# Patient Record
Sex: MALE | Race: WHITE | NOT HISPANIC OR LATINO | Employment: FULL TIME | URBAN - METROPOLITAN AREA
[De-identification: names, ages, dates, MRNs, and addresses within clinical notes are randomized per-mention and may not be internally consistent; named-entity substitution may affect disease eponyms.]

---

## 2017-10-24 ENCOUNTER — TRANSCRIBE ORDERS (OUTPATIENT)
Dept: GENERAL RADIOLOGY | Facility: HOSPITAL | Age: 47
End: 2017-10-24

## 2017-10-24 ENCOUNTER — HOSPITAL ENCOUNTER (OUTPATIENT)
Dept: GENERAL RADIOLOGY | Facility: HOSPITAL | Age: 47
Discharge: HOME | End: 2017-10-24
Admitting: NURSE PRACTITIONER

## 2017-10-24 DIAGNOSIS — M25.562 LEFT KNEE PAIN, UNSPECIFIED CHRONICITY: Primary | ICD-10-CM

## 2017-10-24 PROCEDURE — 73562 X-RAY EXAM OF KNEE 3: CPT

## 2018-11-19 ENCOUNTER — TELEPHONE (OUTPATIENT)
Dept: SURGERY | Facility: CLINIC | Age: 48
End: 2018-11-19

## 2018-11-19 NOTE — TELEPHONE ENCOUNTER
I called pt, I left a message on voice mail asking him to return my call, I will also mail a letter to him asking him to call me as well.

## 2018-11-27 ENCOUNTER — PREP FOR SURGERY (OUTPATIENT)
Dept: OTHER | Facility: HOSPITAL | Age: 48
End: 2018-11-27

## 2018-11-27 ENCOUNTER — TELEPHONE (OUTPATIENT)
Dept: SURGERY | Facility: CLINIC | Age: 48
End: 2018-11-27

## 2018-11-27 DIAGNOSIS — Z12.11 SCREEN FOR COLON CANCER: Primary | ICD-10-CM

## 2018-11-27 RX ORDER — BISACODYL 5 MG/1
10 TABLET, DELAYED RELEASE ORAL 2 TIMES DAILY
Qty: 4 TABLET | Refills: 0 | Status: SHIPPED | OUTPATIENT
Start: 2018-11-27 | End: 2018-11-28

## 2018-11-27 RX ORDER — POLYETHYLENE GLYCOL 1450
1 POWDER (GRAM) MISCELLANEOUS
Qty: 238 G | Refills: 0 | Status: SHIPPED | OUTPATIENT
Start: 2018-11-27 | End: 2018-11-27

## 2018-12-10 PROBLEM — Z12.11 SCREEN FOR COLON CANCER: Status: ACTIVE | Noted: 2018-12-10

## 2019-01-28 ENCOUNTER — TELEPHONE (OUTPATIENT)
Dept: SURGERY | Facility: CLINIC | Age: 49
End: 2019-01-28

## 2019-01-28 NOTE — TELEPHONE ENCOUNTER
Left msg on voice mail for pt to confirm procedure scheduled on 01/30/19 @ [facility]

## 2020-04-06 ENCOUNTER — TRANSCRIBE ORDERS (OUTPATIENT)
Dept: ADMINISTRATIVE | Facility: HOSPITAL | Age: 50
End: 2020-04-06

## 2020-04-06 DIAGNOSIS — K40.20 HERNIA OF TESTICLES: Primary | ICD-10-CM

## 2020-04-07 ENCOUNTER — HOSPITAL ENCOUNTER (OUTPATIENT)
Dept: ULTRASOUND IMAGING | Facility: HOSPITAL | Age: 50
Discharge: HOME | End: 2020-04-07
Admitting: NURSE PRACTITIONER

## 2020-04-07 DIAGNOSIS — K40.20 HERNIA OF TESTICLES: ICD-10-CM

## 2020-04-07 PROCEDURE — 76870 US EXAM SCROTUM: CPT

## 2020-08-27 ENCOUNTER — TELEPHONE (OUTPATIENT)
Dept: SURGERY | Facility: CLINIC | Age: 50
End: 2020-08-27

## 2021-03-31 ENCOUNTER — IMMUNIZATION (OUTPATIENT)
Dept: VACCINE CLINIC | Facility: HOSPITAL | Age: 51
End: 2021-03-31

## 2021-03-31 PROCEDURE — 0001A: CPT | Performed by: INTERNAL MEDICINE

## 2021-03-31 PROCEDURE — 91300 HC SARSCOV02 VAC 30MCG/0.3ML IM: CPT | Performed by: INTERNAL MEDICINE

## 2021-04-21 ENCOUNTER — APPOINTMENT (OUTPATIENT)
Dept: VACCINE CLINIC | Facility: HOSPITAL | Age: 51
End: 2021-04-21

## 2021-04-27 ENCOUNTER — IMMUNIZATION (OUTPATIENT)
Dept: VACCINE CLINIC | Facility: HOSPITAL | Age: 51
End: 2021-04-27

## 2021-04-27 PROCEDURE — 91300 HC SARSCOV02 VAC 30MCG/0.3ML IM: CPT | Performed by: INTERNAL MEDICINE

## 2021-04-27 PROCEDURE — 0002A: CPT | Performed by: INTERNAL MEDICINE

## 2021-07-09 ENCOUNTER — TRANSCRIBE ORDERS (OUTPATIENT)
Dept: ADMINISTRATIVE | Facility: HOSPITAL | Age: 51
End: 2021-07-09

## 2021-07-09 DIAGNOSIS — S83.209A: Primary | ICD-10-CM

## 2021-07-28 ENCOUNTER — HOSPITAL ENCOUNTER (OUTPATIENT)
Dept: MRI IMAGING | Facility: HOSPITAL | Age: 51
Discharge: HOME | End: 2021-07-28
Admitting: ORTHOPAEDIC SURGERY

## 2021-07-28 DIAGNOSIS — S83.209A: ICD-10-CM

## 2021-07-28 PROCEDURE — 73721 MRI JNT OF LWR EXTRE W/O DYE: CPT

## 2021-09-02 ENCOUNTER — TRANSCRIBE ORDERS (OUTPATIENT)
Dept: ADMINISTRATIVE | Facility: HOSPITAL | Age: 51
End: 2021-09-02

## 2021-09-02 DIAGNOSIS — M25.561 RIGHT KNEE PAIN, UNSPECIFIED CHRONICITY: Primary | ICD-10-CM

## 2021-09-13 ENCOUNTER — HOSPITAL ENCOUNTER (OUTPATIENT)
Dept: NUCLEAR MEDICINE | Facility: HOSPITAL | Age: 51
Discharge: HOME | End: 2021-09-13

## 2021-09-13 DIAGNOSIS — M25.561 RIGHT KNEE PAIN, UNSPECIFIED CHRONICITY: ICD-10-CM

## 2021-09-13 PROCEDURE — 0 TECHNETIUM MEDRONATE KIT: Performed by: ORTHOPAEDIC SURGERY

## 2021-09-13 PROCEDURE — 78315 BONE IMAGING 3 PHASE: CPT

## 2021-09-13 PROCEDURE — A9503 TC99M MEDRONATE: HCPCS | Performed by: ORTHOPAEDIC SURGERY

## 2021-09-13 RX ORDER — TC 99M MEDRONATE 20 MG/10ML
25.4 INJECTION, POWDER, LYOPHILIZED, FOR SOLUTION INTRAVENOUS
Status: COMPLETED | OUTPATIENT
Start: 2021-09-13 | End: 2021-09-13

## 2021-09-13 RX ADMIN — TC 99M MEDRONATE 25.4 MILLICURIE: 20 INJECTION, POWDER, LYOPHILIZED, FOR SOLUTION INTRAVENOUS at 10:05

## 2021-10-13 ENCOUNTER — TRANSCRIBE ORDERS (OUTPATIENT)
Dept: PHYSICAL THERAPY | Facility: CLINIC | Age: 51
End: 2021-10-13

## 2021-10-13 DIAGNOSIS — M25.561 RIGHT KNEE PAIN, UNSPECIFIED CHRONICITY: Primary | ICD-10-CM

## 2021-10-21 ENCOUNTER — TREATMENT (OUTPATIENT)
Dept: PHYSICAL THERAPY | Facility: CLINIC | Age: 51
End: 2021-10-21

## 2021-10-21 DIAGNOSIS — M25.561 CHRONIC PAIN OF RIGHT KNEE: Primary | ICD-10-CM

## 2021-10-21 DIAGNOSIS — G89.29 CHRONIC PAIN OF RIGHT KNEE: Primary | ICD-10-CM

## 2021-10-21 PROCEDURE — 97161 PT EVAL LOW COMPLEX 20 MIN: CPT | Performed by: PHYSICAL THERAPIST

## 2021-10-21 NOTE — PROGRESS NOTES
Physical Therapy Initial Evaluation and Plan of Care      Patient: Mellisa Mathews   : 1970  Diagnosis/ICD-10 Code:  Right knee pain, unspecified chronicity [M25.561]  Referring practitioner: Nav Darnell MD    Subjective Evaluation    History of Present Illness  Mechanism of injury: Patient reports that his R knee gradually started hurting him. He states that the knee pain has worsened over the last several months. Patient states that a month ago he was unable to walk on the R knee at all (using crutches). He states now he is just walking with a limp (no crutches). Patient reports that he has to walk up/down stairs one at a time. He states that his sleep was disturbed until recently he has been able to sleep better. He states that tylenol seems to help his knee pain more than anything. He states that he has a lot of stiffness in the knee when he has sat for awhile.       Patient Occupation: [employer] (currently not working) Pain  Current pain ratin  At best pain ratin  At worst pain ratin  Quality: sharp and dull ache  Relieving factors: rest and medications (Tylenol )  Aggravating factors: ambulation, squatting, lifting, stairs, prolonged positioning, movement, standing and repetitive movement  Progression: improved    Social Support  Lives in: multiple-level home  Lives with: spouse    Diagnostic Tests  MRI studies: abnormal (Osteochondral lesions )    Treatments  Previous treatment: injection treatment (Patient reports he had an injection a month ago and it did not seem to help)  Patient Goals  Patient goals for therapy: decreased pain, increased motion, increased strength, independence with ADLs/IADLs and return to work             Objective          Tenderness     Right Knee   No tenderness in the lateral joint line, LCL (distal), LCL (proximal), MCL (distal), MCL (proximal), medial joint line, patellar tendon and quadriceps tendon.     Neurological Testing     Sensation     Knee   Left Knee    Intact: light touch    Right Knee   Intact: light touch     Reflexes   Left   Patellar (L4): normal (2+)  Achilles (S1): normal (2+)    Right   Patellar (L4): normal (2+)  Achilles (S1): normal (2+)    Active Range of Motion   Left Knee   Flexion: 135 degrees   Extension: Left knee active extension: -5-0-135.     Right Knee   Flexion: 125 degrees   Extension: Right knee active extension: -2-0-125.     Patellar Mobility   Left Knee Patellar tendons within functional limits include the medial, lateral, superior and inferior.     Right Knee Hypomobile in the medial, lateral, superior and inferior patellar tendon(s).     Strength/Myotome Testing     Left Hip   Planes of Motion   Flexion: 4+  Extension: 4-  Abduction: 4  Adduction: 4+    Right Hip   Planes of Motion   Flexion: 4+  Extension: 3+  Abduction: 3+  Adduction: 4-    Left Knee   Flexion: 4+  Extension: 4  Quadriceps contraction: good    Right Knee   Flexion: 4  Extension: 4-  Quadriceps contraction: fair    Tests     Right Knee   Positive medial Elliott and Thessaly's test at 20 degrees.   Negative lateral Elliott, Thessaly's test at 5 degrees, valgus stress test at 0 degrees, valgus stress test at 30 degrees, varus stress test at 0 degrees and varus stress test at 30 degrees.      General Comments     Knee Comments  No swelling noted in R knee today.     Patient ambulating with antalgic gait pattern. Reduced knee flexion and early heel off is noted on R LE.     Patient required use of B UE to rise to stand due to increased R knee pain.          Assessment & Plan     Assessment  Impairments: abnormal gait, abnormal muscle tone, abnormal or restricted ROM, activity intolerance, impaired physical strength, lacks appropriate home exercise program, pain with function and weight-bearing intolerance  Assessment details: Patient is a 51 year old male who comes to physical therapy with complaints of R knee pain. Signs and symptoms are consistent with chronic knee  pain. The patient currently has pain with weight bearing, decreased ROM, decreased strength, and inability to perform many essential functional activities. Patient is currently unable to work due knee pain. No joint line tenderness noted. Imaging demonstrates two osteochondral lesions on medial aspect of R knee that is likely the cause of knee pain. Patient did demonstrate positive findings with medial meniscus test but this is likely due to irritation from medial osteochondral lesions. Patient was given an HEP to assist with above listed limitations. Pt will benefit from skilled PT services to address the above issues.     Functional Limitations: sleeping, walking, pulling, pushing, uncomfortable because of pain, standing, stooping and unable to perform repetitive tasks  Goals  Plan Goals: SHORT TERM GOALS:  2 weeks       1. Pt independent with HEP  2. Pt to demonstrate bj hip strength 4/5 or greater to improve stability with ambulation  3. Pt to report being able to walk for 10 minutes without increasing pain in the right knee    LONG TERM GOALS:   6 weeks  1. Pt to demonstrate ability to perform full functional squat with good form and control of the knees and without increasing pain  2. Pt to demonstrate bj hip strength to 4+/5 or greater to improve safety with ambulation on uneven surfaces  3. Pt to return to work full duty without increased pain in the right knee   4. Pt to demonstrate ability to perform step up/down 8 inch step x10 safely and without pain in the right knee         Plan  Therapy options: will be seen for skilled physical therapy services  Planned modality interventions: cryotherapy and thermotherapy (hydrocollator packs)  Planned therapy interventions: balance/weight-bearing training, flexibility, functional ROM exercises, home exercise program, joint mobilization, therapeutic activities, stretching, strengthening, soft tissue mobilization, neuromuscular re-education and manual  therapy  Frequency: 2x week  Duration in weeks: 6  Treatment plan discussed with: patient        Manual Therapy:    5     mins  57453;  Therapeutic Exercise:    7     mins  08351;     Neuromuscular Elana:        mins  65030;    Therapeutic Activity:          mins  84555;     Gait Training:           mins  34519;     Ultrasound:          mins  66198;    Electrical Stimulation:         mins  66015 ( );  Dry Needling          mins self-pay    Timed Treatment:   12   mins   Total Treatment:     41   mins    PT SIGNATURE: Jossy Trotter, DANIE   DATE TREATMENT INITIATED: 10/21/2021    Initial Certification  Certification Period: 1/19/2022  I certify that the therapy services are furnished while this patient is under my care.  The services outlined above are required by this patient, and will be reviewed every 90 days.     PHYSICIAN: Nav Darnell MD      DATE:     Please sign and return via fax to [phone].. Thank you, [facility].

## 2021-10-28 ENCOUNTER — TREATMENT (OUTPATIENT)
Dept: PHYSICAL THERAPY | Facility: CLINIC | Age: 51
End: 2021-10-28

## 2021-10-28 DIAGNOSIS — M25.561 CHRONIC PAIN OF RIGHT KNEE: Primary | ICD-10-CM

## 2021-10-28 DIAGNOSIS — G89.29 CHRONIC PAIN OF RIGHT KNEE: Primary | ICD-10-CM

## 2021-10-28 PROCEDURE — 97110 THERAPEUTIC EXERCISES: CPT | Performed by: PHYSICAL THERAPIST

## 2021-10-28 PROCEDURE — 97140 MANUAL THERAPY 1/> REGIONS: CPT | Performed by: PHYSICAL THERAPIST

## 2021-10-28 NOTE — PROGRESS NOTES
Physical Therapy Daily Progress Note    Patient Information  Mellisa Mathews  1970      Visit # : 2    Mellisa Mathews reports 0/10 pain today at rest.  Patient reports that he followed up with Dr. Darnell yesterday and he is being released back to work. He states that he is also being referred to a rheumatologist due to something found in blood work. Patient reports that his knee is doing about the same. States he only has mild discomfort with walking today.          Objective Pt presents to PT today with no distress noted.     No tenderness to palpation in R knee today.     R knee AROM   Flexion: 125  Extension: -1-0-125    Patient ambulating with mild antalgic gait pattern today. Reduced stance time and early heel off noted on R LE.     See Exercise, Manual, and Modality Logs for complete treatment.     Assessment/Plan  Patient tolerated session well with no reports of knee pain with exercises. Patient with no tenderness to palpation in R knee today. Patient tolerated manual therapy well. He continues to demonstrate reduced patellar inf/sup and med/lat glides. Patient encouraged to continue HEP at home.      Progress per Plan of Care  PT will continue to monitor patients signs and symptoms and progress patient as tolerated.     Visit Diagnoses:    ICD-10-CM ICD-9-CM   1. Chronic pain of right knee  M25.561 719.46    G89.29 338.29            Manual Therapy:    13     mins  88206;  Therapeutic Exercise:    19     mins  44357;     Neuromuscular Elana:        mins  78625;    Therapeutic Activity:          mins  94192;     Gait Training:           mins  33464;     Ultrasound:          mins  42324;    Electrical Stimulation:         mins  01744 ( );  Dry Needling          mins self-pay    Timed Treatment:   32   mins   Total Treatment:     38   mins          Jossy Trotter PT  Physical Therapist

## 2021-11-02 ENCOUNTER — TREATMENT (OUTPATIENT)
Dept: PHYSICAL THERAPY | Facility: CLINIC | Age: 51
End: 2021-11-02

## 2021-11-02 DIAGNOSIS — G89.29 CHRONIC PAIN OF RIGHT KNEE: Primary | ICD-10-CM

## 2021-11-02 DIAGNOSIS — M25.561 CHRONIC PAIN OF RIGHT KNEE: Primary | ICD-10-CM

## 2021-11-02 PROCEDURE — 97140 MANUAL THERAPY 1/> REGIONS: CPT | Performed by: PHYSICAL THERAPIST

## 2021-11-02 PROCEDURE — 97110 THERAPEUTIC EXERCISES: CPT | Performed by: PHYSICAL THERAPIST

## 2021-11-02 NOTE — PROGRESS NOTES
Physical Therapy Daily Progress Note    Patient Information  Mellisa Mathews  1970      Visit # : 3    Mellisa Mathews reports 2/10 pain today at rest.  Patient reports that he returned to work and his knee is a little sore. Patient reports that most of the pain is on the inside of his knee. Patient states that overall his knee is doing better.         Objective Pt presents to PT today with no distress noted.     Patient with mild tenderness to palpation in medial aspect of R knee.     Patient ambulating with very mild antalgic gait pattern today. Patient is moving better in clinic today.      See Exercise, Manual, and Modality Logs for complete treatment.     Assessment/Plan  Patient tolerated session well with no increases in pain with exercises. Patient with mild tenderness to palpation in R knee today. Patient tolerated manual therapy well with no reports of discomfort. Patient encouraged to continue HEP and utilize ice frequently at home. Patient educated to limit activities that increase R knee irritation.       Progress per Plan of Care  PT will continue to monitor patients signs and symptoms and progress patient as tolerated    Visit Diagnoses:    ICD-10-CM ICD-9-CM   1. Chronic pain of right knee  M25.561 719.46    G89.29 338.29            Manual Therapy:    15     mins  98307;  Therapeutic Exercise:    18     mins  14633;     Neuromuscular Elana:        mins  88178;    Therapeutic Activity:          mins  96574;     Gait Training:           mins  10430;     Ultrasound:          mins  20870;    Electrical Stimulation:         mins  22227 ( );  Dry Needling          mins self-pay    Timed Treatment:   33   mins   Total Treatment:     43   mins          Jossy Trotter, PT  Physical Therapist

## 2021-11-05 ENCOUNTER — TREATMENT (OUTPATIENT)
Dept: PHYSICAL THERAPY | Facility: CLINIC | Age: 51
End: 2021-11-05

## 2021-11-05 DIAGNOSIS — M25.561 CHRONIC PAIN OF RIGHT KNEE: Primary | ICD-10-CM

## 2021-11-05 DIAGNOSIS — G89.29 CHRONIC PAIN OF RIGHT KNEE: Primary | ICD-10-CM

## 2021-11-05 PROCEDURE — 97110 THERAPEUTIC EXERCISES: CPT | Performed by: PHYSICAL THERAPIST

## 2021-11-05 PROCEDURE — 97530 THERAPEUTIC ACTIVITIES: CPT | Performed by: PHYSICAL THERAPIST

## 2021-11-05 PROCEDURE — 97140 MANUAL THERAPY 1/> REGIONS: CPT | Performed by: PHYSICAL THERAPIST

## 2021-11-05 NOTE — PROGRESS NOTES
Physical Therapy Daily Progress Note    Patient Information  Mellisa Mathews  1970      Visit # : 4    Mellisa Mathews reports 0/10 pain today at rest.  Patient reports that his knee is getting better. He states that he has noticed less pain when he walks. Long reports that stairs is still a little hard for him but reports that walking is definitely getting easier.         Objective Pt presents to PT today with no distress noted.     Patient with no tenderness to palpation in R knee today.     Patient demonstrating improved motion with patellar glides (all directions)    No antalgic gait pattern noted today.     R knee Flexion AROM: 128      See Exercise, Manual, and Modality Logs for complete treatment.     Assessment/Plan  Patient tolerated session well with no increases in pain with exercises. Patient had mild reports of fatigue in R LE following session today. Patient tolerated manual therapy well today and demonstrates improved patellar glides. Patient continues to demonstrate mild tightness in R hamstring. Patient encouraged to continue HEP and utilize ice as needed at home.       Progress per Plan of Care  PT will continue to monitor patients signs and symptoms and progress patient as tolerated.     Visit Diagnoses:    ICD-10-CM ICD-9-CM   1. Chronic pain of right knee  M25.561 719.46    G89.29 338.29            Manual Therapy:    16     mins  59134;  Therapeutic Exercise:    18     mins  84320;     Neuromuscular Elana:        mins  11004;    Therapeutic Activity:     9     mins  68686;     Gait Training:           mins  02175;     Ultrasound:          mins  99625;    Electrical Stimulation:         mins  27501 ( );  Dry Needling          mins self-pay    Timed Treatment:   43   mins   Total Treatment:     53   mins          Jossy Trotter PT  Physical Therapist

## 2021-11-10 ENCOUNTER — TREATMENT (OUTPATIENT)
Dept: PHYSICAL THERAPY | Facility: CLINIC | Age: 51
End: 2021-11-10

## 2021-11-10 DIAGNOSIS — G89.29 CHRONIC PAIN OF RIGHT KNEE: Primary | ICD-10-CM

## 2021-11-10 DIAGNOSIS — M25.561 CHRONIC PAIN OF RIGHT KNEE: Primary | ICD-10-CM

## 2021-11-10 PROCEDURE — 97530 THERAPEUTIC ACTIVITIES: CPT | Performed by: PHYSICAL THERAPIST

## 2021-11-10 PROCEDURE — 97140 MANUAL THERAPY 1/> REGIONS: CPT | Performed by: PHYSICAL THERAPIST

## 2021-11-10 PROCEDURE — 97110 THERAPEUTIC EXERCISES: CPT | Performed by: PHYSICAL THERAPIST

## 2021-11-10 NOTE — PROGRESS NOTES
Physical Therapy Daily Progress Note    Patient Information  Mellisa Mathews  1970      Visit # : 5    Mellisa Mathews reports 0/10 pain today at rest.  Patient reports he feels a little pain when he walks. He states that he did well with work and states that he only had a little irritation on the 3rd day of work and this improved the next day. He states that he still has an issue with going downstairs and states that his knee feels like it is going to give out.         Objective Pt presents to PT today with no distress noted.    Patient with no tenderness to palpation in R knee today.     Patient ambulating well with no antalgic gait pattern noted.     Patient with mild reports of discomfort with SL TG squats today.       See Exercise, Manual, and Modality Logs for complete treatment.     Assessment/Plan  Patient tolerated session well. Patient with no tenderness to palpation in R knee today. Patient with mild reports of discomfort in the R knee with SL TG squats. Discomfort resolved at completion of exercise. Patient tolerated manual therapy well. Patient encouraged to continue HEP and utilize ice as needed at home.       Progress per Plan of Care  PT will continue to monitor patients signs and symptoms and progress patient as tolerated.    Visit Diagnoses:    ICD-10-CM ICD-9-CM   1. Chronic pain of right knee  M25.561 719.46    G89.29 338.29            Manual Therapy:    14     mins  95915;  Therapeutic Exercise:    19     mins  74382;     Neuromuscular Elana:        mins  89610;    Therapeutic Activity:     10     mins  05101;     Gait Training:           mins  61232;     Ultrasound:          mins  12305;    Electrical Stimulation:         mins  25125 ( );  Dry Needling          mins self-pay    Timed Treatment:   43   mins   Total Treatment:     50   mins          Jossy Trotter PT  Physical Therapist

## 2021-11-12 ENCOUNTER — TREATMENT (OUTPATIENT)
Dept: PHYSICAL THERAPY | Facility: CLINIC | Age: 51
End: 2021-11-12

## 2021-11-12 DIAGNOSIS — M25.561 CHRONIC PAIN OF RIGHT KNEE: Primary | ICD-10-CM

## 2021-11-12 DIAGNOSIS — G89.29 CHRONIC PAIN OF RIGHT KNEE: Primary | ICD-10-CM

## 2021-11-12 PROCEDURE — 97110 THERAPEUTIC EXERCISES: CPT | Performed by: PHYSICAL THERAPIST

## 2021-11-12 PROCEDURE — 97530 THERAPEUTIC ACTIVITIES: CPT | Performed by: PHYSICAL THERAPIST

## 2021-11-12 PROCEDURE — 97140 MANUAL THERAPY 1/> REGIONS: CPT | Performed by: PHYSICAL THERAPIST

## 2021-11-12 NOTE — PROGRESS NOTES
Physical Therapy Daily Progress Note    Patient Information  Mellisa Mathews  1970      Visit # : 6    Mellisa Mathews reports 0/10 pain today at rest.  Patient reports that he is having 1-2/10 pain with walking today. He states that he was hurting in the R knee and stiff when he first woke up. He states the more he moved the better his knee felt. He states that his pain is minor but fluctuates some.        Objective Pt presents to PT today with no distress noted.     Patient is demonstrating good quad contraction with R quad.     Patient with no tenderness to palpation in R knee today.     Patient ambulating well with no antalgic gait pattern noted.      See Exercise, Manual, and Modality Logs for complete treatment.     Assessment/Plan  Patient tolerated session well. Patient with mild discomfort reported with bosu step up but was abolished at completion of exercise. Patient had increased R knee pain with managing stairs in clinic and was deferred today. Patient tolerated manual therapy well and is demonstrating improved patellar mobility. Patient encouraged to continue HEP and utilize ice as needed at home.      Progress per Plan of Care  PT will continue to monitor patients signs and symptoms and progress patient as tolerated.     Visit Diagnoses:    ICD-10-CM ICD-9-CM   1. Chronic pain of right knee  M25.561 719.46    G89.29 338.29            Manual Therapy:    12     mins  67115;  Therapeutic Exercise:    20     mins  70719;     Neuromuscular Elana:        mins  85211;    Therapeutic Activity:     11     mins  41888;     Gait Training:           mins  89831;     Ultrasound:          mins  32225;    Electrical Stimulation:         mins  44375 ( );  Dry Needling          mins self-pay    Timed Treatment:   43   mins   Total Treatment:     50   mins          Jossy Trotter PT  Physical Therapist

## 2021-11-29 ENCOUNTER — TREATMENT (OUTPATIENT)
Dept: PHYSICAL THERAPY | Facility: CLINIC | Age: 51
End: 2021-11-29

## 2021-11-29 DIAGNOSIS — G89.29 CHRONIC PAIN OF RIGHT KNEE: Primary | ICD-10-CM

## 2021-11-29 DIAGNOSIS — M25.561 CHRONIC PAIN OF RIGHT KNEE: Primary | ICD-10-CM

## 2021-11-29 PROCEDURE — 97140 MANUAL THERAPY 1/> REGIONS: CPT | Performed by: PHYSICAL THERAPIST

## 2021-11-29 PROCEDURE — 97110 THERAPEUTIC EXERCISES: CPT | Performed by: PHYSICAL THERAPIST

## 2021-11-29 PROCEDURE — 97530 THERAPEUTIC ACTIVITIES: CPT | Performed by: PHYSICAL THERAPIST

## 2021-11-29 NOTE — PROGRESS NOTES
Physical Therapy Daily Progress Note    Patient Information  Mellisa Mathews  1970      Visit # : 7    Mellisa Mathews reports 0/10 pain today at rest.  Patient reports that his knee has been doing well. He states that he has pain in the knee when walking up/down stairs. Patient reports that he is able to manage stairs better than he was before.         Objective Pt presents to PT today with no distress noted.     Patient with no tenderness to palpation in R knee today.     Patient ambulating well with no antalgic gait pattern noted.     Patient was able to perform bosu step up with no knee pain today.       See Exercise, Manual, and Modality Logs for complete treatment.     Assessment/Plan  Patient tolerated session well with no increases in pain with exercises. Patient with no tenderness to palpation in R knee today. Patient is ambulating well with no antalgic gait pattern noted. Patient tolerated new exercises with no issues noted. Patient tolerated manual therapy well and is demonstrating good patellar mobility. Patient encouraged to continue HEP and utilize as needed at home.       Progress per Plan of Care  PT will continue to monitor patients signs and symptoms and progress patient as tolerated.     Visit Diagnoses:    ICD-10-CM ICD-9-CM   1. Chronic pain of right knee  M25.561 719.46    G89.29 338.29            Manual Therapy:    12     mins  29159;  Therapeutic Exercise:    19     mins  39771;     Neuromuscular Elana:        mins  89846;    Therapeutic Activity:     14     mins  61187;     Gait Training:           mins  63489;     Ultrasound:          mins  98372;    Electrical Stimulation:         mins  19485 ( );  Dry Needling          mins self-pay    Timed Treatment:   45   mins   Total Treatment:     55   mins          Jossy Trotter PT  Physical Therapist

## 2021-12-06 ENCOUNTER — TREATMENT (OUTPATIENT)
Dept: PHYSICAL THERAPY | Facility: CLINIC | Age: 51
End: 2021-12-06

## 2021-12-06 DIAGNOSIS — G89.29 CHRONIC PAIN OF RIGHT KNEE: Primary | ICD-10-CM

## 2021-12-06 DIAGNOSIS — M25.561 CHRONIC PAIN OF RIGHT KNEE: Primary | ICD-10-CM

## 2021-12-06 PROCEDURE — 97530 THERAPEUTIC ACTIVITIES: CPT | Performed by: PHYSICAL THERAPIST

## 2021-12-06 PROCEDURE — 97110 THERAPEUTIC EXERCISES: CPT | Performed by: PHYSICAL THERAPIST

## 2021-12-06 NOTE — PROGRESS NOTES
Physical Therapy Daily Progress Note    Patient Information  Mellisa Mathews  1970      Visit # : 8    Mellisa Mathews reports 0/10 pain today at rest.  Patient reports that his knee is doing well. He states that he is managing stairs much better now and has not noticed any other limitations at home or work.         Objective Pt presents to PT today with no distress noted.     Patient ambulating well with no antalgic gait pattern noted.     Patient with no tenderness to palpation in R knee.     Patient demonstrating good strength in R LE. 4+ grossly.     R knee AROM   -2-0-132      See Exercise, Manual, and Modality Logs for complete treatment.     Assessment/Plan  Patient tolerated session well with no increases in pain with exercises. Patient is demonstrating good strength and ROM in R LE. Patient with no tenderness to palpation today. Holding therapy at this time. HEP was reviewed with patient with patient demonstrating understanding. Patient encouraged to contact clinic with any questions or concerns or if symptoms return.       Other  Holding therapy at this time.     Visit Diagnoses:    ICD-10-CM ICD-9-CM   1. Chronic pain of right knee  M25.561 719.46    G89.29 338.29            Manual Therapy:    6     mins  87256;  Therapeutic Exercise:    24     mins  15069;     Neuromuscular Elana:        mins  59041;    Therapeutic Activity:     15     mins  62174;     Gait Training:           mins  07742;     Ultrasound:          mins  06835;    Electrical Stimulation:         mins  54168 ( );  Dry Needling          mins self-pay    Timed Treatment:   45   mins   Total Treatment:     55   mins          Jossy Trotter PT  Physical Therapist

## 2022-11-30 ENCOUNTER — HOSPITAL ENCOUNTER (OUTPATIENT)
Dept: GENERAL RADIOLOGY | Facility: HOSPITAL | Age: 52
Discharge: HOME | End: 2022-11-30
Admitting: NURSE PRACTITIONER

## 2022-11-30 ENCOUNTER — TRANSCRIBE ORDERS (OUTPATIENT)
Dept: GENERAL RADIOLOGY | Facility: HOSPITAL | Age: 52
End: 2022-11-30

## 2022-11-30 DIAGNOSIS — M25.512 LEFT SHOULDER PAIN, UNSPECIFIED CHRONICITY: ICD-10-CM

## 2022-11-30 DIAGNOSIS — M25.521 RIGHT ELBOW PAIN: ICD-10-CM

## 2022-11-30 DIAGNOSIS — M25.512 LEFT SHOULDER PAIN, UNSPECIFIED CHRONICITY: Primary | ICD-10-CM

## 2022-11-30 PROCEDURE — 73080 X-RAY EXAM OF ELBOW: CPT

## 2022-11-30 PROCEDURE — 73030 X-RAY EXAM OF SHOULDER: CPT

## 2023-09-15 ENCOUNTER — TELEPHONE (OUTPATIENT)
Dept: SURGERY | Facility: CLINIC | Age: 53
End: 2023-09-15
Payer: COMMERCIAL

## 2023-09-15 NOTE — TELEPHONE ENCOUNTER
Please review to see if patient can be scheduled as Open Access-pt referred to Dr. Overton. Thank you.

## 2023-09-22 NOTE — TELEPHONE ENCOUNTER
PRESCREENING FOR OPEN ACCESS SCHEDULING    Mellisa Mathews, 1970  [id number]    09/22/23    If, the patient answers yes to any of the following questions the provider will be informed prior to scheduling open access for approval and documented in the chart.    []  Yes  [x] No    1. Have you ever had a colonoscopy in the past?      When:        Where:       Polyps or other:     []  Yes  [x] No    2. Family history of colon cancer?      Relation:       Age of onset:       Do you currently have any of the following?    []  Yes  [x] No  Rectal bleeding, if so, how long?     []  Yes  [x] No  Abdominal pain, if so, how long?    []  Yes  [x] No  Constipation, if so, how long?    []  Yes  [x] No  Diarrhea, if so, how long?    []  Yes  [x] No  Weight loss, is so, how much?    [] Yes  [x] No  Small caliber stool, if so, how long?      Have you ever had any of the following conditions?    [] Yes  [x] No  Heart attack?      When?       Last cardiac workup?     Blood thinners?    [] Yes  [x] No   Lung problems, asthma or COPD?  [] Yes  [x] No  Oxygen required?       [] Yes  [x] No  Stroke?     [] Yes  [x] No  Have you ever had a reaction to anesthesia?

## 2023-09-22 NOTE — TELEPHONE ENCOUNTER
Pt would like to be scheduled at [facility] on 10/13-verified Pharmacy-thank you.

## 2023-09-25 NOTE — TELEPHONE ENCOUNTER
"I talked with pt, he stated \"I cannot do it on the 13th, I have to look at my work schedule and call you back.\"  "

## 2023-12-05 ENCOUNTER — HOSPITAL ENCOUNTER (EMERGENCY)
Facility: HOSPITAL | Age: 53
Discharge: HOME | End: 2023-12-05
Attending: EMERGENCY MEDICINE
Payer: COMMERCIAL

## 2023-12-05 ENCOUNTER — APPOINTMENT (OUTPATIENT)
Dept: GENERAL RADIOLOGY | Facility: HOSPITAL | Age: 53
End: 2023-12-05
Payer: COMMERCIAL

## 2023-12-05 VITALS
TEMPERATURE: 97.9 F | HEART RATE: 87 BPM | DIASTOLIC BLOOD PRESSURE: 93 MMHG | OXYGEN SATURATION: 97 % | BODY MASS INDEX: 25.2 KG/M2 | RESPIRATION RATE: 16 BRPM | SYSTOLIC BLOOD PRESSURE: 151 MMHG | HEIGHT: 71 IN | WEIGHT: 180 LBS

## 2023-12-05 DIAGNOSIS — S22.41XA CLOSED FRACTURE OF MULTIPLE RIBS OF RIGHT SIDE, INITIAL ENCOUNTER: Primary | ICD-10-CM

## 2023-12-05 PROCEDURE — 71101 X-RAY EXAM UNILAT RIBS/CHEST: CPT

## 2023-12-05 PROCEDURE — 99283 EMERGENCY DEPT VISIT LOW MDM: CPT

## 2023-12-05 RX ORDER — HYDROCODONE BITARTRATE AND ACETAMINOPHEN 5; 325 MG/1; MG/1
1 TABLET ORAL EVERY 6 HOURS PRN
Qty: 10 TABLET | Refills: 0 | Status: SHIPPED | OUTPATIENT
Start: 2023-12-05

## 2023-12-05 RX ORDER — LIDOCAINE 50 MG/G
1 PATCH TOPICAL
Status: DISCONTINUED | OUTPATIENT
Start: 2023-12-05 | End: 2023-12-05 | Stop reason: HOSPADM

## 2023-12-05 RX ORDER — ONDANSETRON 4 MG/1
4 TABLET, ORALLY DISINTEGRATING ORAL 4 TIMES DAILY PRN
Qty: 20 TABLET | Refills: 0 | Status: SHIPPED | OUTPATIENT
Start: 2023-12-05

## 2023-12-05 RX ORDER — HYDROCODONE BITARTRATE AND ACETAMINOPHEN 5; 325 MG/1; MG/1
2 TABLET ORAL ONCE
Status: COMPLETED | OUTPATIENT
Start: 2023-12-05 | End: 2023-12-05

## 2023-12-05 RX ORDER — LIDOCAINE 50 MG/G
1 PATCH TOPICAL EVERY 24 HOURS
Qty: 6 PATCH | Refills: 0 | Status: SHIPPED | OUTPATIENT
Start: 2023-12-05

## 2023-12-05 RX ADMIN — LIDOCAINE 1 PATCH: 700 PATCH TOPICAL at 12:05

## 2023-12-05 RX ADMIN — HYDROCODONE BITARTRATE AND ACETAMINOPHEN 2 TABLET: 5; 325 TABLET ORAL at 10:23

## 2023-12-05 NOTE — Clinical Note
[facility] EMERGENCY DEPARTMENT  [address] [address]  Phone: [phone]    Mellisa Mathews was seen and treated in our emergency department on 12/5/2023.  He may return to work on 12/08/2023.         Thank you for choosing [facility].    Tonio Rey, DO

## 2023-12-05 NOTE — ED PROVIDER NOTES
Subjective   History of Present Illness  53-year-old male presents to ED with chief complaint of rib pain.  Patient fell 2 days ago landing on his right ribs.  He has had severe pain in his right ribs since.  Pain is worse with movement or breathing.  No other complaints at this time.      Review of Systems   Cardiovascular:         Rib pain   All other systems reviewed and are negative.      History reviewed. No pertinent past medical history.    No Known Allergies    History reviewed. No pertinent surgical history.    History reviewed. No pertinent family history.    Social History     Socioeconomic History    Marital status:            Objective   Physical Exam  Vitals and nursing note reviewed.   Constitutional:       General: He is not in acute distress.     Appearance: Normal appearance. He is well-developed. He is not diaphoretic.   HENT:      Head: Normocephalic and atraumatic.      Nose: Nose normal.   Eyes:      Conjunctiva/sclera: Conjunctivae normal.      Pupils: Pupils are equal, round, and reactive to light.   Cardiovascular:      Rate and Rhythm: Normal rate and regular rhythm.      Pulses: Normal pulses.   Pulmonary:      Effort: Pulmonary effort is normal. No respiratory distress.      Breath sounds: Normal breath sounds.      Comments: Tenderness to palpation to the right lateral inferior chest wall.  Chest:      Chest wall: Tenderness present.   Abdominal:      General: There is no distension.      Palpations: Abdomen is soft.      Tenderness: There is no abdominal tenderness.   Musculoskeletal:         General: No deformity.   Neurological:      Mental Status: He is alert and oriented to person, place, and time.      Cranial Nerves: No cranial nerve deficit.      Coordination: Coordination normal.         Procedures           ED Course                                             Medical Decision Making  Amount and/or Complexity of Data Reviewed  Radiology: ordered.    Risk  Prescription  drug management.      Data interpreted: Nursing notes reviewed vital signs reviewed Labs independently interpreted interpretative by me.  Imaging interpretative by me.  EKG independently interpreted by me    Oxygen saturations included.    Counseling discussed the results above with the patient regarding need for admission or discharge.  Patient understands and agrees with plan of care    53-year-old male presents to the ED with chief complaint of rib pain differential diagnosis would include rib fractures hemothorax pneumothorax chest wall contusion, other.    X-ray confirms fractures of the eighth and ninth ribs.  No pneumothorax.  No hemothorax.  Pain management given.  Patient is appropriate for discharge with continued pain management.  Follow-up outpatient as needed.      Final diagnoses:   None       ED Disposition  ED Disposition       None            No follow-up provider specified.       Medication List      No changes were made to your prescriptions during this visit.          [place] [id number]      Phone: [phone]   HYDROcodone-acetaminophen 5-325 MG per tablet  lidocaine 5 %  ondansetron ODT 4 MG disintegrating tablet            Tonio Rey, DO  12/12/23 0727